# Patient Record
Sex: FEMALE | Race: WHITE | NOT HISPANIC OR LATINO | Employment: OTHER | URBAN - METROPOLITAN AREA
[De-identification: names, ages, dates, MRNs, and addresses within clinical notes are randomized per-mention and may not be internally consistent; named-entity substitution may affect disease eponyms.]

---

## 2021-12-31 ENCOUNTER — APPOINTMENT (OUTPATIENT)
Dept: RADIOLOGY | Facility: MEDICAL CENTER | Age: 76
DRG: 661 | End: 2021-12-31
Attending: EMERGENCY MEDICINE
Payer: COMMERCIAL

## 2021-12-31 ENCOUNTER — ANESTHESIA (OUTPATIENT)
Dept: SURGERY | Facility: MEDICAL CENTER | Age: 76
DRG: 661 | End: 2021-12-31
Payer: COMMERCIAL

## 2021-12-31 ENCOUNTER — HOSPITAL ENCOUNTER (OUTPATIENT)
Dept: RADIOLOGY | Facility: MEDICAL CENTER | Age: 76
End: 2021-12-31

## 2021-12-31 ENCOUNTER — APPOINTMENT (OUTPATIENT)
Dept: RADIOLOGY | Facility: MEDICAL CENTER | Age: 76
DRG: 661 | End: 2021-12-31
Attending: UROLOGY
Payer: COMMERCIAL

## 2021-12-31 ENCOUNTER — HOSPITAL ENCOUNTER (INPATIENT)
Facility: MEDICAL CENTER | Age: 76
LOS: 1 days | DRG: 661 | End: 2022-01-01
Attending: EMERGENCY MEDICINE | Admitting: STUDENT IN AN ORGANIZED HEALTH CARE EDUCATION/TRAINING PROGRAM
Payer: COMMERCIAL

## 2021-12-31 ENCOUNTER — ANESTHESIA EVENT (OUTPATIENT)
Dept: SURGERY | Facility: MEDICAL CENTER | Age: 76
DRG: 661 | End: 2021-12-31
Payer: COMMERCIAL

## 2021-12-31 DIAGNOSIS — N20.1 URETEROLITHIASIS: ICD-10-CM

## 2021-12-31 DIAGNOSIS — R10.9 FLANK PAIN: ICD-10-CM

## 2021-12-31 PROBLEM — N10 ACUTE PYELONEPHRITIS: Status: ACTIVE | Noted: 2021-12-31

## 2021-12-31 PROBLEM — N18.9 CKD (CHRONIC KIDNEY DISEASE): Status: RESOLVED | Noted: 2021-12-31 | Resolved: 2021-12-31

## 2021-12-31 PROBLEM — N13.5 URETEROVESICAL JUNCTION (UVJ) OBSTRUCTION: Status: ACTIVE | Noted: 2021-12-31

## 2021-12-31 PROBLEM — E78.5 DYSLIPIDEMIA: Status: ACTIVE | Noted: 2021-12-31

## 2021-12-31 PROBLEM — N18.9 CKD (CHRONIC KIDNEY DISEASE): Status: ACTIVE | Noted: 2021-12-31

## 2021-12-31 LAB
ALBUMIN SERPL BCP-MCNC: 4.3 G/DL (ref 3.2–4.9)
ALBUMIN/GLOB SERPL: 2.2 G/DL
ALP SERPL-CCNC: 89 U/L (ref 30–99)
ALT SERPL-CCNC: 20 U/L (ref 2–50)
ANION GAP SERPL CALC-SCNC: 13 MMOL/L (ref 7–16)
APPEARANCE UR: ABNORMAL
AST SERPL-CCNC: 20 U/L (ref 12–45)
BACTERIA #/AREA URNS HPF: NEGATIVE /HPF
BASOPHILS # BLD AUTO: 0.5 % (ref 0–1.8)
BASOPHILS # BLD: 0.04 K/UL (ref 0–0.12)
BILIRUB SERPL-MCNC: 0.7 MG/DL (ref 0.1–1.5)
BILIRUB UR QL STRIP.AUTO: NEGATIVE
BUN SERPL-MCNC: 20 MG/DL (ref 8–22)
CALCIUM SERPL-MCNC: 9.3 MG/DL (ref 8.5–10.5)
CAOX CRY #/AREA URNS HPF: ABNORMAL /HPF
CHLORIDE SERPL-SCNC: 105 MMOL/L (ref 96–112)
CO2 SERPL-SCNC: 20 MMOL/L (ref 20–33)
COLOR UR: YELLOW
CREAT SERPL-MCNC: 1.21 MG/DL (ref 0.5–1.4)
EKG IMPRESSION: NORMAL
EOSINOPHIL # BLD AUTO: 0.03 K/UL (ref 0–0.51)
EOSINOPHIL NFR BLD: 0.4 % (ref 0–6.9)
EPI CELLS #/AREA URNS HPF: ABNORMAL /HPF
ERYTHROCYTE [DISTWIDTH] IN BLOOD BY AUTOMATED COUNT: 45.2 FL (ref 35.9–50)
GLOBULIN SER CALC-MCNC: 2 G/DL (ref 1.9–3.5)
GLUCOSE SERPL-MCNC: 105 MG/DL (ref 65–99)
GLUCOSE UR STRIP.AUTO-MCNC: NEGATIVE MG/DL
HCT VFR BLD AUTO: 42.7 % (ref 37–47)
HGB BLD-MCNC: 13.9 G/DL (ref 12–16)
IMM GRANULOCYTES # BLD AUTO: 0.03 K/UL (ref 0–0.11)
IMM GRANULOCYTES NFR BLD AUTO: 0.4 % (ref 0–0.9)
KETONES UR STRIP.AUTO-MCNC: 15 MG/DL
LEUKOCYTE ESTERASE UR QL STRIP.AUTO: ABNORMAL
LIPASE SERPL-CCNC: 26 U/L (ref 11–82)
LYMPHOCYTES # BLD AUTO: 1.09 K/UL (ref 1–4.8)
LYMPHOCYTES NFR BLD: 14.9 % (ref 22–41)
MAGNESIUM SERPL-MCNC: 1.9 MG/DL (ref 1.5–2.5)
MCH RBC QN AUTO: 30 PG (ref 27–33)
MCHC RBC AUTO-ENTMCNC: 32.6 G/DL (ref 33.6–35)
MCV RBC AUTO: 92.2 FL (ref 81.4–97.8)
MICRO URNS: ABNORMAL
MONOCYTES # BLD AUTO: 0.65 K/UL (ref 0–0.85)
MONOCYTES NFR BLD AUTO: 8.9 % (ref 0–13.4)
NEUTROPHILS # BLD AUTO: 5.49 K/UL (ref 2–7.15)
NEUTROPHILS NFR BLD: 74.9 % (ref 44–72)
NITRITE UR QL STRIP.AUTO: NEGATIVE
NRBC # BLD AUTO: 0 K/UL
NRBC BLD-RTO: 0 /100 WBC
PH UR STRIP.AUTO: 5 [PH] (ref 5–8)
PHOSPHATE SERPL-MCNC: 2.8 MG/DL (ref 2.5–4.5)
PLATELET # BLD AUTO: 259 K/UL (ref 164–446)
PMV BLD AUTO: 10.4 FL (ref 9–12.9)
POTASSIUM SERPL-SCNC: 4.3 MMOL/L (ref 3.6–5.5)
PROT SERPL-MCNC: 6.3 G/DL (ref 6–8.2)
PROT UR QL STRIP: NEGATIVE MG/DL
RBC # BLD AUTO: 4.63 M/UL (ref 4.2–5.4)
RBC # URNS HPF: ABNORMAL /HPF
RBC UR QL AUTO: ABNORMAL
SARS-COV+SARS-COV-2 AG RESP QL IA.RAPID: NOTDETECTED
SODIUM SERPL-SCNC: 138 MMOL/L (ref 135–145)
SP GR UR STRIP.AUTO: 1.03
SPECIMEN SOURCE: NORMAL
UROBILINOGEN UR STRIP.AUTO-MCNC: 0.2 MG/DL
WBC # BLD AUTO: 7.3 K/UL (ref 4.8–10.8)
WBC #/AREA URNS HPF: ABNORMAL /HPF

## 2021-12-31 PROCEDURE — 700105 HCHG RX REV CODE 258: Performed by: STUDENT IN AN ORGANIZED HEALTH CARE EDUCATION/TRAINING PROGRAM

## 2021-12-31 PROCEDURE — 700111 HCHG RX REV CODE 636 W/ 250 OVERRIDE (IP): Performed by: STUDENT IN AN ORGANIZED HEALTH CARE EDUCATION/TRAINING PROGRAM

## 2021-12-31 PROCEDURE — 700111 HCHG RX REV CODE 636 W/ 250 OVERRIDE (IP): Performed by: EMERGENCY MEDICINE

## 2021-12-31 PROCEDURE — 76775 US EXAM ABDO BACK WALL LIM: CPT

## 2021-12-31 PROCEDURE — 500879 HCHG PACK, CYSTO: Performed by: UROLOGY

## 2021-12-31 PROCEDURE — 81015 MICROSCOPIC EXAM OF URINE: CPT

## 2021-12-31 PROCEDURE — C2617 STENT, NON-COR, TEM W/O DEL: HCPCS | Performed by: UROLOGY

## 2021-12-31 PROCEDURE — 93005 ELECTROCARDIOGRAM TRACING: CPT | Performed by: UROLOGY

## 2021-12-31 PROCEDURE — 85025 COMPLETE CBC W/AUTO DIFF WBC: CPT

## 2021-12-31 PROCEDURE — 84100 ASSAY OF PHOSPHORUS: CPT

## 2021-12-31 PROCEDURE — 160035 HCHG PACU - 1ST 60 MINS PHASE I: Performed by: UROLOGY

## 2021-12-31 PROCEDURE — 700102 HCHG RX REV CODE 250 W/ 637 OVERRIDE(OP): Performed by: STUDENT IN AN ORGANIZED HEALTH CARE EDUCATION/TRAINING PROGRAM

## 2021-12-31 PROCEDURE — 83735 ASSAY OF MAGNESIUM: CPT

## 2021-12-31 PROCEDURE — 93010 ELECTROCARDIOGRAM REPORT: CPT | Performed by: INTERNAL MEDICINE

## 2021-12-31 PROCEDURE — 87426 SARSCOV CORONAVIRUS AG IA: CPT

## 2021-12-31 PROCEDURE — 87086 URINE CULTURE/COLONY COUNT: CPT

## 2021-12-31 PROCEDURE — 501329 HCHG SET, CYSTO IRRIG Y TUR: Performed by: UROLOGY

## 2021-12-31 PROCEDURE — C1769 GUIDE WIRE: HCPCS | Performed by: UROLOGY

## 2021-12-31 PROCEDURE — 160002 HCHG RECOVERY MINUTES (STAT): Performed by: UROLOGY

## 2021-12-31 PROCEDURE — 99291 CRITICAL CARE FIRST HOUR: CPT

## 2021-12-31 PROCEDURE — 96374 THER/PROPH/DIAG INJ IV PUSH: CPT

## 2021-12-31 PROCEDURE — 80053 COMPREHEN METABOLIC PANEL: CPT

## 2021-12-31 PROCEDURE — 83690 ASSAY OF LIPASE: CPT

## 2021-12-31 PROCEDURE — A9270 NON-COVERED ITEM OR SERVICE: HCPCS | Performed by: STUDENT IN AN ORGANIZED HEALTH CARE EDUCATION/TRAINING PROGRAM

## 2021-12-31 PROCEDURE — 700101 HCHG RX REV CODE 250: Performed by: STUDENT IN AN ORGANIZED HEALTH CARE EDUCATION/TRAINING PROGRAM

## 2021-12-31 PROCEDURE — 81003 URINALYSIS AUTO W/O SCOPE: CPT

## 2021-12-31 PROCEDURE — 99222 1ST HOSP IP/OBS MODERATE 55: CPT | Performed by: STUDENT IN AN ORGANIZED HEALTH CARE EDUCATION/TRAINING PROGRAM

## 2021-12-31 PROCEDURE — 160028 HCHG SURGERY MINUTES - 1ST 30 MINS LEVEL 3: Performed by: UROLOGY

## 2021-12-31 PROCEDURE — 160009 HCHG ANES TIME/MIN: Performed by: UROLOGY

## 2021-12-31 PROCEDURE — 770001 HCHG ROOM/CARE - MED/SURG/GYN PRIV*

## 2021-12-31 PROCEDURE — 160048 HCHG OR STATISTICAL LEVEL 1-5: Performed by: UROLOGY

## 2021-12-31 DEVICE — STENT UROLOGICAL POLARIS 6X24  ULTRA: Type: IMPLANTABLE DEVICE | Site: URETER | Status: FUNCTIONAL

## 2021-12-31 RX ORDER — ONDANSETRON 4 MG/1
4 TABLET, ORALLY DISINTEGRATING ORAL EVERY 6 HOURS PRN
COMMUNITY
Start: 2021-12-31

## 2021-12-31 RX ORDER — ATORVASTATIN CALCIUM 20 MG/1
20 TABLET, FILM COATED ORAL
COMMUNITY
Start: 2021-11-22

## 2021-12-31 RX ORDER — SODIUM CHLORIDE, SODIUM LACTATE, POTASSIUM CHLORIDE, CALCIUM CHLORIDE 600; 310; 30; 20 MG/100ML; MG/100ML; MG/100ML; MG/100ML
INJECTION, SOLUTION INTRAVENOUS
Status: DISCONTINUED | OUTPATIENT
Start: 2021-12-31 | End: 2021-12-31 | Stop reason: SURG

## 2021-12-31 RX ORDER — ACETAMINOPHEN 325 MG/1
650 TABLET ORAL EVERY 6 HOURS PRN
Status: DISCONTINUED | OUTPATIENT
Start: 2021-12-31 | End: 2022-01-01 | Stop reason: HOSPADM

## 2021-12-31 RX ORDER — POLYETHYLENE GLYCOL 3350 17 G/17G
1 POWDER, FOR SOLUTION ORAL
Status: DISCONTINUED | OUTPATIENT
Start: 2021-12-31 | End: 2022-01-01 | Stop reason: HOSPADM

## 2021-12-31 RX ORDER — OXYCODONE HCL 5 MG/5 ML
10 SOLUTION, ORAL ORAL
Status: DISCONTINUED | OUTPATIENT
Start: 2021-12-31 | End: 2021-12-31 | Stop reason: HOSPADM

## 2021-12-31 RX ORDER — HYDROMORPHONE HYDROCHLORIDE 1 MG/ML
0.4 INJECTION, SOLUTION INTRAMUSCULAR; INTRAVENOUS; SUBCUTANEOUS
Status: DISCONTINUED | OUTPATIENT
Start: 2021-12-31 | End: 2021-12-31 | Stop reason: HOSPADM

## 2021-12-31 RX ORDER — OXYCODONE HYDROCHLORIDE 5 MG/1
5 TABLET ORAL
Status: DISCONTINUED | OUTPATIENT
Start: 2021-12-31 | End: 2022-01-01 | Stop reason: HOSPADM

## 2021-12-31 RX ORDER — ACETAMINOPHEN 325 MG/1
TABLET ORAL PRN
Status: DISCONTINUED | OUTPATIENT
Start: 2021-12-31 | End: 2021-12-31 | Stop reason: SURG

## 2021-12-31 RX ORDER — CEFDINIR 300 MG/1
300 CAPSULE ORAL EVERY 12 HOURS
COMMUNITY
Start: 2021-12-31

## 2021-12-31 RX ORDER — ONDANSETRON 2 MG/ML
4 INJECTION INTRAMUSCULAR; INTRAVENOUS EVERY 4 HOURS PRN
Status: DISCONTINUED | OUTPATIENT
Start: 2021-12-31 | End: 2022-01-01 | Stop reason: HOSPADM

## 2021-12-31 RX ORDER — BISACODYL 10 MG
10 SUPPOSITORY, RECTAL RECTAL
Status: DISCONTINUED | OUTPATIENT
Start: 2021-12-31 | End: 2022-01-01 | Stop reason: HOSPADM

## 2021-12-31 RX ORDER — AMOXICILLIN 250 MG
2 CAPSULE ORAL 2 TIMES DAILY
Status: DISCONTINUED | OUTPATIENT
Start: 2022-01-01 | End: 2022-01-01 | Stop reason: HOSPADM

## 2021-12-31 RX ORDER — SODIUM CHLORIDE, SODIUM LACTATE, POTASSIUM CHLORIDE, CALCIUM CHLORIDE 600; 310; 30; 20 MG/100ML; MG/100ML; MG/100ML; MG/100ML
INJECTION, SOLUTION INTRAVENOUS CONTINUOUS
Status: DISCONTINUED | OUTPATIENT
Start: 2021-12-31 | End: 2022-01-01 | Stop reason: HOSPADM

## 2021-12-31 RX ORDER — HYDROMORPHONE HYDROCHLORIDE 1 MG/ML
0.1 INJECTION, SOLUTION INTRAMUSCULAR; INTRAVENOUS; SUBCUTANEOUS
Status: DISCONTINUED | OUTPATIENT
Start: 2021-12-31 | End: 2021-12-31 | Stop reason: HOSPADM

## 2021-12-31 RX ORDER — OXYCODONE HCL 5 MG/5 ML
5 SOLUTION, ORAL ORAL
Status: DISCONTINUED | OUTPATIENT
Start: 2021-12-31 | End: 2021-12-31 | Stop reason: HOSPADM

## 2021-12-31 RX ORDER — HYDROCODONE BITARTRATE AND ACETAMINOPHEN 5; 325 MG/1; MG/1
1 TABLET ORAL EVERY 4 HOURS PRN
COMMUNITY
Start: 2021-12-30

## 2021-12-31 RX ORDER — KETOROLAC TROMETHAMINE 10 MG/1
10 TABLET, FILM COATED ORAL EVERY 6 HOURS PRN
COMMUNITY
Start: 2021-12-30

## 2021-12-31 RX ORDER — OXYCODONE HYDROCHLORIDE 10 MG/1
10 TABLET ORAL
Status: DISCONTINUED | OUTPATIENT
Start: 2021-12-31 | End: 2022-01-01 | Stop reason: HOSPADM

## 2021-12-31 RX ORDER — CEFTRIAXONE 2 G/1
2 INJECTION, POWDER, FOR SOLUTION INTRAMUSCULAR; INTRAVENOUS ONCE
Status: COMPLETED | OUTPATIENT
Start: 2021-12-31 | End: 2021-12-31

## 2021-12-31 RX ORDER — ONDANSETRON 2 MG/ML
4 INJECTION INTRAMUSCULAR; INTRAVENOUS
Status: DISCONTINUED | OUTPATIENT
Start: 2021-12-31 | End: 2021-12-31 | Stop reason: HOSPADM

## 2021-12-31 RX ORDER — DEXAMETHASONE SODIUM PHOSPHATE 4 MG/ML
INJECTION, SOLUTION INTRA-ARTICULAR; INTRALESIONAL; INTRAMUSCULAR; INTRAVENOUS; SOFT TISSUE PRN
Status: DISCONTINUED | OUTPATIENT
Start: 2021-12-31 | End: 2021-12-31 | Stop reason: SURG

## 2021-12-31 RX ORDER — LABETALOL HYDROCHLORIDE 5 MG/ML
10 INJECTION, SOLUTION INTRAVENOUS EVERY 4 HOURS PRN
Status: DISCONTINUED | OUTPATIENT
Start: 2021-12-31 | End: 2022-01-01 | Stop reason: HOSPADM

## 2021-12-31 RX ORDER — POLYETHYLENE GLYCOL 3350 17 G/17G
17 POWDER, FOR SOLUTION ORAL
COMMUNITY

## 2021-12-31 RX ORDER — LIDOCAINE HYDROCHLORIDE 20 MG/ML
INJECTION, SOLUTION EPIDURAL; INFILTRATION; INTRACAUDAL; PERINEURAL PRN
Status: DISCONTINUED | OUTPATIENT
Start: 2021-12-31 | End: 2021-12-31 | Stop reason: SURG

## 2021-12-31 RX ORDER — HYDROMORPHONE HYDROCHLORIDE 1 MG/ML
0.5 INJECTION, SOLUTION INTRAMUSCULAR; INTRAVENOUS; SUBCUTANEOUS
Status: DISCONTINUED | OUTPATIENT
Start: 2021-12-31 | End: 2022-01-01 | Stop reason: HOSPADM

## 2021-12-31 RX ORDER — ONDANSETRON 4 MG/1
4 TABLET, ORALLY DISINTEGRATING ORAL EVERY 4 HOURS PRN
Status: DISCONTINUED | OUTPATIENT
Start: 2021-12-31 | End: 2022-01-01 | Stop reason: HOSPADM

## 2021-12-31 RX ORDER — HALOPERIDOL 5 MG/ML
1 INJECTION INTRAMUSCULAR
Status: DISCONTINUED | OUTPATIENT
Start: 2021-12-31 | End: 2021-12-31 | Stop reason: HOSPADM

## 2021-12-31 RX ORDER — ACETAMINOPHEN 500 MG
TABLET ORAL
Status: COMPLETED
Start: 2021-12-31 | End: 2021-12-31

## 2021-12-31 RX ORDER — PHENOL 1.4 %
1 AEROSOL, SPRAY (ML) MUCOUS MEMBRANE EVERY MORNING
COMMUNITY

## 2021-12-31 RX ORDER — HYDROMORPHONE HYDROCHLORIDE 1 MG/ML
0.2 INJECTION, SOLUTION INTRAMUSCULAR; INTRAVENOUS; SUBCUTANEOUS
Status: DISCONTINUED | OUTPATIENT
Start: 2021-12-31 | End: 2021-12-31 | Stop reason: HOSPADM

## 2021-12-31 RX ADMIN — CEFTRIAXONE SODIUM 2 G: 2 INJECTION, POWDER, FOR SOLUTION INTRAMUSCULAR; INTRAVENOUS at 19:52

## 2021-12-31 RX ADMIN — ACETAMINOPHEN 500 MG: 325 TABLET, FILM COATED ORAL at 20:55

## 2021-12-31 RX ADMIN — EPHEDRINE SULFATE 15 MG: 50 INJECTION, SOLUTION INTRAVENOUS at 21:32

## 2021-12-31 RX ADMIN — SODIUM CHLORIDE, POTASSIUM CHLORIDE, SODIUM LACTATE AND CALCIUM CHLORIDE: 600; 310; 30; 20 INJECTION, SOLUTION INTRAVENOUS at 21:21

## 2021-12-31 RX ADMIN — PROPOFOL 150 MG: 10 INJECTION, EMULSION INTRAVENOUS at 21:27

## 2021-12-31 RX ADMIN — SODIUM CHLORIDE, POTASSIUM CHLORIDE, SODIUM LACTATE AND CALCIUM CHLORIDE: 600; 310; 30; 20 INJECTION, SOLUTION INTRAVENOUS at 22:51

## 2021-12-31 RX ADMIN — FENTANYL CITRATE 50 MCG: 50 INJECTION, SOLUTION INTRAMUSCULAR; INTRAVENOUS at 21:27

## 2021-12-31 RX ADMIN — SODIUM CHLORIDE, POTASSIUM CHLORIDE, SODIUM LACTATE AND CALCIUM CHLORIDE: 600; 310; 30; 20 INJECTION, SOLUTION INTRAVENOUS at 19:59

## 2021-12-31 RX ADMIN — ONDANSETRON 4 MG: 2 INJECTION INTRAMUSCULAR; INTRAVENOUS at 21:32

## 2021-12-31 RX ADMIN — DEXAMETHASONE SODIUM PHOSPHATE 4 MG: 4 INJECTION, SOLUTION INTRA-ARTICULAR; INTRALESIONAL; INTRAMUSCULAR; INTRAVENOUS; SOFT TISSUE at 21:32

## 2021-12-31 RX ADMIN — LIDOCAINE HYDROCHLORIDE 70 MG: 20 INJECTION, SOLUTION EPIDURAL; INFILTRATION; INTRACAUDAL at 21:27

## 2021-12-31 RX ADMIN — EPHEDRINE SULFATE 10 MG: 50 INJECTION, SOLUTION INTRAVENOUS at 21:40

## 2021-12-31 ASSESSMENT — ENCOUNTER SYMPTOMS
FLANK PAIN: 1
MYALGIAS: 0
SHORTNESS OF BREATH: 0
SENSORY CHANGE: 0
SPUTUM PRODUCTION: 0
NERVOUS/ANXIOUS: 0
VOMITING: 1
WEAKNESS: 0
FEVER: 0
PALPITATIONS: 0
PHOTOPHOBIA: 0
NECK PAIN: 0
SEIZURES: 0
DOUBLE VISION: 0
LOSS OF CONSCIOUSNESS: 0
NAUSEA: 1
CONSTIPATION: 1
CHILLS: 1
COUGH: 0
HALLUCINATIONS: 0

## 2021-12-31 NOTE — ED NOTES
Pts spouse ambulated up to the  with complaints of worsening pain and discomfort per pt. Pt and spouse concerned over wait time, triage process was explained to patient along with wait time. Apologized for the wait time.

## 2021-12-31 NOTE — ED TRIAGE NOTES
Chief Complaint   Patient presents with   • Flank Pain     Pt BIB self to triage, report of left sided flank pain that started yesterday, hx of same. pt went to [facility] with no relief. pt states the ER had no Urology on call and thats why she came to [facility].   • N/V   • Chills   Pt given urinary supplies/instruction.    Explained to pt triage process, made pt aware to tell this RN/staff of any changes/concerns, pt verbalized understanding of process and instructions given. Pt to ER lobby.     Patient safety sitter maintains at bedside in direct sight of patient with 1:1 monitoring.

## 2022-01-01 VITALS
TEMPERATURE: 98.7 F | DIASTOLIC BLOOD PRESSURE: 59 MMHG | BODY MASS INDEX: 24.57 KG/M2 | RESPIRATION RATE: 18 BRPM | SYSTOLIC BLOOD PRESSURE: 121 MMHG | HEART RATE: 80 BPM | OXYGEN SATURATION: 97 % | WEIGHT: 156.53 LBS | HEIGHT: 67 IN

## 2022-01-01 PROBLEM — N10 ACUTE PYELONEPHRITIS: Status: RESOLVED | Noted: 2021-12-31 | Resolved: 2022-01-01

## 2022-01-01 PROBLEM — N13.5 URETEROVESICAL JUNCTION (UVJ) OBSTRUCTION: Status: RESOLVED | Noted: 2021-12-31 | Resolved: 2022-01-01

## 2022-01-01 LAB
ANION GAP SERPL CALC-SCNC: 12 MMOL/L (ref 7–16)
BASOPHILS # BLD AUTO: 0.2 % (ref 0–1.8)
BASOPHILS # BLD: 0.01 K/UL (ref 0–0.12)
BUN SERPL-MCNC: 18 MG/DL (ref 8–22)
CALCIUM SERPL-MCNC: 8.8 MG/DL (ref 8.5–10.5)
CHLORIDE SERPL-SCNC: 108 MMOL/L (ref 96–112)
CO2 SERPL-SCNC: 21 MMOL/L (ref 20–33)
CREAT SERPL-MCNC: 1.11 MG/DL (ref 0.5–1.4)
EOSINOPHIL # BLD AUTO: 0 K/UL (ref 0–0.51)
EOSINOPHIL NFR BLD: 0 % (ref 0–6.9)
ERYTHROCYTE [DISTWIDTH] IN BLOOD BY AUTOMATED COUNT: 44.9 FL (ref 35.9–50)
GLUCOSE SERPL-MCNC: 142 MG/DL (ref 65–99)
HCT VFR BLD AUTO: 40.1 % (ref 37–47)
HGB BLD-MCNC: 13.4 G/DL (ref 12–16)
IMM GRANULOCYTES # BLD AUTO: 0.03 K/UL (ref 0–0.11)
IMM GRANULOCYTES NFR BLD AUTO: 0.6 % (ref 0–0.9)
LYMPHOCYTES # BLD AUTO: 0.65 K/UL (ref 1–4.8)
LYMPHOCYTES NFR BLD: 12.3 % (ref 22–41)
MCH RBC QN AUTO: 30.9 PG (ref 27–33)
MCHC RBC AUTO-ENTMCNC: 33.4 G/DL (ref 33.6–35)
MCV RBC AUTO: 92.6 FL (ref 81.4–97.8)
MONOCYTES # BLD AUTO: 0.07 K/UL (ref 0–0.85)
MONOCYTES NFR BLD AUTO: 1.3 % (ref 0–13.4)
NEUTROPHILS # BLD AUTO: 4.54 K/UL (ref 2–7.15)
NEUTROPHILS NFR BLD: 85.6 % (ref 44–72)
NRBC # BLD AUTO: 0 K/UL
NRBC BLD-RTO: 0 /100 WBC
PLATELET # BLD AUTO: 237 K/UL (ref 164–446)
PMV BLD AUTO: 10 FL (ref 9–12.9)
POTASSIUM SERPL-SCNC: 4.3 MMOL/L (ref 3.6–5.5)
RBC # BLD AUTO: 4.33 M/UL (ref 4.2–5.4)
SODIUM SERPL-SCNC: 141 MMOL/L (ref 135–145)
WBC # BLD AUTO: 5.3 K/UL (ref 4.8–10.8)

## 2022-01-01 PROCEDURE — 99239 HOSP IP/OBS DSCHRG MGMT >30: CPT | Performed by: HOSPITALIST

## 2022-01-01 PROCEDURE — 80048 BASIC METABOLIC PNL TOTAL CA: CPT

## 2022-01-01 PROCEDURE — 36415 COLL VENOUS BLD VENIPUNCTURE: CPT

## 2022-01-01 PROCEDURE — 85025 COMPLETE CBC W/AUTO DIFF WBC: CPT

## 2022-01-01 ASSESSMENT — LIFESTYLE VARIABLES
EVER HAD A DRINK FIRST THING IN THE MORNING TO STEADY YOUR NERVES TO GET RID OF A HANGOVER: NO
TOTAL SCORE: 0
HAVE PEOPLE ANNOYED YOU BY CRITICIZING YOUR DRINKING: NO
CONSUMPTION TOTAL: NEGATIVE
ON A TYPICAL DAY WHEN YOU DRINK ALCOHOL HOW MANY DRINKS DO YOU HAVE: 1
ALCOHOL_USE: YES
EVER FELT BAD OR GUILTY ABOUT YOUR DRINKING: NO
AVERAGE NUMBER OF DAYS PER WEEK YOU HAVE A DRINK CONTAINING ALCOHOL: 0
HAVE YOU EVER FELT YOU SHOULD CUT DOWN ON YOUR DRINKING: NO
TOTAL SCORE: 0
TOTAL SCORE: 0
DOES PATIENT WANT TO STOP DRINKING: NO
HOW MANY TIMES IN THE PAST YEAR HAVE YOU HAD 5 OR MORE DRINKS IN A DAY: 0
SUBSTANCE_ABUSE: 0

## 2022-01-01 ASSESSMENT — COGNITIVE AND FUNCTIONAL STATUS - GENERAL
CLIMB 3 TO 5 STEPS WITH RAILING: A LITTLE
DAILY ACTIVITIY SCORE: 24
SUGGESTED CMS G CODE MODIFIER MOBILITY: CJ
WALKING IN HOSPITAL ROOM: A LITTLE
MOBILITY SCORE: 22
SUGGESTED CMS G CODE MODIFIER DAILY ACTIVITY: CH

## 2022-01-01 ASSESSMENT — ENCOUNTER SYMPTOMS
PALPITATIONS: 0
NECK PAIN: 0
NAUSEA: 0
FEVER: 0
MYALGIAS: 0
DEPRESSION: 0
BLURRED VISION: 0
DIARRHEA: 0
DOUBLE VISION: 0
VOMITING: 0
ABDOMINAL PAIN: 0
CHILLS: 0
TINGLING: 0
WEIGHT LOSS: 0
DIZZINESS: 0
SPUTUM PRODUCTION: 0
HEADACHES: 0
HEMOPTYSIS: 0
COUGH: 0

## 2022-01-01 ASSESSMENT — PATIENT HEALTH QUESTIONNAIRE - PHQ9
SUM OF ALL RESPONSES TO PHQ9 QUESTIONS 1 AND 2: 0
2. FEELING DOWN, DEPRESSED, IRRITABLE, OR HOPELESS: NOT AT ALL
1. LITTLE INTEREST OR PLEASURE IN DOING THINGS: NOT AT ALL

## 2022-01-01 NOTE — ED NOTES
Received report from Malaika GALE RN. Upon shift change pt is resting in bed with even and unlabored breaths, in no apparent distress. Will continue to monitor.

## 2022-01-01 NOTE — ANESTHESIA TIME REPORT
Anesthesia Start and Stop Event Times     Date Time Event    12/31/2021 2055 Ready for Procedure     2121 Anesthesia Start     2155 Anesthesia Stop        Responsible Staff  12/31/21    Name Role Begin End    Min KATHERINE Hurley M.D. Anesth 2121 2155        Preop Diagnosis (Free Text):  Pre-op Diagnosis             Preop Diagnosis (Codes):    Premium Reason  A. 3PM - 7AM    Comments:

## 2022-01-01 NOTE — ANESTHESIA POSTPROCEDURE EVALUATION
Patient: Ro Fletcher    Procedure Summary     Date: 12/31/21 Room / Location: [place] [address] / SURGERY [facility]    Anesthesia Start: 2121 Anesthesia Stop: 2155    Procedure: CYSTOSCOPY, WITH URETERAL STENT INSERTION (Left Ureter) Diagnosis: (LEFT DISTAL URETERAL STONE, URINARY TRACT INFECTION)    Surgeons: Curt Strauss M.D. Responsible Provider: Obed Hurley M.D.    Anesthesia Type: general ASA Status: 2 - Emergent          Final Anesthesia Type: general  Last vitals  BP   Blood Pressure : 126/40    Temp   36.4 °C (97.6 °F)    Pulse   62   Resp   18    SpO2   94 %      Anesthesia Post Evaluation    Patient location during evaluation: PACU  Patient participation: complete - patient participated  Level of consciousness: awake and alert    Airway patency: patent  Anesthetic complications: no  Cardiovascular status: hemodynamically stable  Respiratory status: acceptable  Hydration status: euvolemic    PONV: none          No complications documented.     Nurse Pain Score: 0 (NPRS)

## 2022-01-01 NOTE — PROGRESS NOTES
76 year old with left flank pain and 6mm distal ureteral stone with UTI. Discussed with the patient the recommendation for cystoscopy and a left stent as well as the risks and benefits. Informed consent has been given to me by  Justine to proceed with the cystoscopy and left stent.

## 2022-01-01 NOTE — OP REPORT
DATE OF SERVICE:  12/31/2021     PREOPERATIVE DIAGNOSES:  1.  Left flank pain.  2.  Left distal ureteral stone.  3.  Left obstructing pyelonephrosis.     OPERATIONS AND PROCEDURES PERFORMED:  1.  Rigid cystourethroscopy.  2.  Left 6-St Helenian x 24 cm double-J stent insertion.     SURGEON:  Curt Strauss MD     ANESTHESIOLOGIST:  Obed Hurley MD     ANESTHESIA:  General laryngeal mask.     POSTOPERATIVE DIAGNOSES:  1.  Left flank pain.  2.  Left distal ureteral stone.  3.  Left obstructing pyelonephrosis.     COMPLICATIONS:  None.     DRAINS:  A 6-St Helenian x 24 cm stent, left ureter.     INTRAOPERATIVE FINDINGS:  The patient has a high-grade left ureteral   obstruction with pus draining under pressure, plus placement of guidewire and   continuing with stent placement.     INDICATIONS:  The patient is a 76-year-old woman with a 1-week history of   hematuria.  She was evaluated, went on to develop a urinary tract infection,   started on antibiotic therapy but then went on to develop severe intractable   nausea, vomiting, malaise, chills and was presented to [facility] Emergency Room   with evidence of obstructing pyonephrosis.  Films were sent from [facility] and   showed evidence of a left 5.6 mm distal ureteral stone with hydronephrosis.  I   discussed with the patient the option of nephrostomy tube versus stent.  She   wished to proceed with stent.  We discussed the risk of the procedure   including but not limited to risk of perioperative worsening of her infection   with sepsis, a risk of inability to place a stent requiring nephrostomy tube.    With stent placement, there is associated risk of having significant pain,   urgency, frequency, migration as well as the fact that no attempts will be   made to treat the stone due to the presence of infection and that she will   need a secondary procedure, failure to follow through with the stent removal   can result in significant stone formation and loss of renal function.  I have    also discussed the perioperative risk of deep vein thrombosis, pulmonary   embolism, aspiration pneumonia, heart attack, stroke and death.  Informed   consent was given to me by the patient to proceed and she was marked on her   left thigh with my initials DH and letter Y for safe site surgery.     DESCRIPTION IN DETAIL:  After informed consent was obtained, the patient was   brought to the operating room and placed supine.  A general laryngeal mask   anesthetic was administered in a balanced fashion.  Bilateral sequential   compression device in place and operational and the patient was positioned in   modified lithotomy and the operative area was Betadine prepped and draped in   the usual sterile fashion.  A surgical timeout was called and all members of   the operative team agree as the patient's name, procedure to be performed, the   fact that it is left side and that she has received Rocephin.  As such,   attention was directed to the procedure.     I passed a generously lubricated 25-Omani rigid cystoscope per urethra.  The   urethra was normal.  The bladder showed mild erythema of the left ureteral   orifice did not exhibit efflux.  The right ureteral orifice had efflux.  There   was 1+ trabeculation.  The bladder was drained.  I then cannulated the left   ureteral orifice with a 0.038 Sensor wire.  Upon doing so, high-grade pus was   seen draining out of the left ureter, advanced the wire up into the kidney and   try to use as minimal hydrodistention of the bladder as possible.  With   drainage of the bladder, I was able to advance a #6-Omani x 24 cm double-J   stent up into her kidney.  When I withdrew the guidewire, there was a good   coil in the renal pelvis and in the bladder, the eyelets were seen draining   pus under high pressure.  At the end of the case, the bladder was drained.    She tolerated the procedure well without complication, was awakened in the   operating room and transferred to  recovery room where she arrived in stable   condition.        ______________________________  MD MIKO Easley/KRISSY    DD:  12/31/2021 22:08  DT:  12/31/2021 22:43    Job#:  [id number]

## 2022-01-01 NOTE — OR SURGEON
Immediate Post OP Note    PreOp Diagnosis:Left flank pain                               Left distal ureteral stone                                Left obstructing pyonephrosis      PostOp Diagnosis: As above      Procedure(s):  CYSTOSCOPY   LEFT URETERAL STENT INSERTION    Surgeon(s):  Curt Strauss M.D.    Anesthesiologist/Type of Anesthesia:  Anesthesiologist: Obed Hurley M.D./General LMA    Surgical Staff:  Circulator: Damian Woodward R.N.  Scrub Person: Cain James    Specimens removed if any:  None    Estimated Blood Loss: N/A    Findings: High grade ureteral obstruction with pus draining under pressure past placement of guide wire and with stent    Complications: None  Drains: 6 Comoran x 24cm stent in the left ureter        12/31/2021 10:00 PM Curt Strauss M.D.

## 2022-01-01 NOTE — H&P
[facility] Medicine History & Physical Note    Date of Service  12/31/2021    Primary Care Physician  Pcp Not In Computer    Consultants  urology    Specialist Names: Dr. Strauss    Code Status  Full Code    Chief Complaint  Chief Complaint   Patient presents with   • Flank Pain     Pt BIB self to triage, report of left sided flank pain that started yesterday, hx of same. pt went to [facility] with no relief. pt states the ER had no Urology on call and thats why she came to [facility].   • N/V   • Chills       History of Presenting Illness  Ro Fletcher is a 76 y.o. female with  dyslipidemia controlled with statin and prior history of multiple episodes of nephrolithiasis, who presented 12/31/2021 with known obstructive UVJ stone with hydroureteronephrosis, now with worsening pain, nausea, vomiting.  Patient has been experiencing hematuria for over a week, was sent for a CT outpatient that was negative for stone.  She had mild left-sided flank pain at that time was described as aching, no aggravating or alleviating factors.  Following her negative CT the pain worsened and she went to the ED today which showed left-sided 5.6 mm UVJ stone with hydroureteronephrosis.  She was not having any systemic symptoms at that time, main complaint was pain and hematuria. Through joint decision-making between ERP, patient, discussion with urology as well as inability to transfer to facilities given capacity patient elected to go home with p.o. Omnicef, Zofran, Toradol.  She was treated with Rocephin in their ED.  Since leaving the hospital she has had chills, nausea with vomiting, worsening left flank pain now severe so she presented to [facility] for further evaluation.  Otherwise denies headaches, dyspnea, cough, diarrhea.  In the ED she was nontoxic, ERP discussed case with Dr. Strauss from urology who agrees to see patient and likely perform procedure tonight.  She was given Rocephin and subsequently for the hospitalist  for admission.    I discussed the plan of care with patient, bedside RN and pharmacy.    Review of Systems  Review of Systems   Constitutional: Positive for chills and malaise/fatigue. Negative for fever.   HENT: Negative for congestion and nosebleeds.    Eyes: Negative for double vision and photophobia.   Respiratory: Negative for cough, sputum production and shortness of breath.    Cardiovascular: Negative for chest pain and palpitations.   Gastrointestinal: Positive for constipation, nausea and vomiting.   Genitourinary: Positive for flank pain and hematuria. Negative for dysuria and urgency.   Musculoskeletal: Negative for myalgias and neck pain.   Neurological: Negative for sensory change, seizures, loss of consciousness and weakness.   Psychiatric/Behavioral: Negative for hallucinations. The patient is not nervous/anxious.        Past Medical History   has no past medical history on file.    Surgical History   has no past surgical history on file.     Family History  family history is not on file.   Family history reviewed with patient. There is no family history that is pertinent to the chief complaint.     Social History   reports that she has never smoked. She has never used smokeless tobacco. She reports current alcohol use. She reports that she does not use drugs.    Allergies  Allergies   Allergen Reactions   • Codeine Vomiting     hallucinations       Medications  None       Physical Exam  Temp:  [36.4 °C (97.6 °F)-36.9 °C (98.5 °F)] 36.9 °C (98.5 °F)  Pulse:  [64-71] 71  Resp:  [13-20] 13  BP: (105-130)/(58-76) 121/76  SpO2:  [99 %-100 %] 99 %  Blood Pressure : 121/76   Temperature: 36.9 °C (98.5 °F)   Pulse: 71   Respiration: 13   Pulse Oximetry: 99 %       Physical Exam  Vitals and nursing note reviewed.   Constitutional:       General: She is not in acute distress.     Appearance: She is normal weight. She is not toxic-appearing.      Comments: 76-year-old female appears stated age alert and  conversant able to speak full sentences without becoming breathless no distress   HENT:      Head: Normocephalic and atraumatic.      Nose: Nose normal. No rhinorrhea.      Mouth/Throat:      Mouth: Mucous membranes are dry.      Pharynx: Oropharynx is clear.   Eyes:      Extraocular Movements: Extraocular movements intact.      Conjunctiva/sclera: Conjunctivae normal.      Pupils: Pupils are equal, round, and reactive to light.   Cardiovascular:      Rate and Rhythm: Normal rate and regular rhythm.      Pulses: Normal pulses.      Heart sounds: No murmur heard.      Pulmonary:      Effort: Pulmonary effort is normal. No respiratory distress.      Breath sounds: Normal breath sounds. No wheezing, rhonchi or rales.   Abdominal:      General: Bowel sounds are normal. There is no distension.      Palpations: Abdomen is soft.      Tenderness: There is abdominal tenderness (suprapubic, mild). There is left CVA tenderness. There is no right CVA tenderness or guarding.   Musculoskeletal:         General: Normal range of motion.      Cervical back: Normal range of motion and neck supple.      Comments: 1+ b/l Lower extremity edema   Skin:     General: Skin is warm and dry.      Capillary Refill: Capillary refill takes less than 2 seconds.   Neurological:      General: No focal deficit present.      Mental Status: She is alert and oriented to person, place, and time. Mental status is at baseline.      Cranial Nerves: No cranial nerve deficit.      Sensory: No sensory deficit.      Motor: No weakness.      Coordination: Coordination normal.   Psychiatric:         Mood and Affect: Mood normal.         Behavior: Behavior normal.         Thought Content: Thought content normal.         Judgment: Judgment normal.         Laboratory:  Recent Labs     12/31/21  1249   WBC 7.3   RBC 4.63   HEMOGLOBIN 13.9   HEMATOCRIT 42.7   MCV 92.2   MCH 30.0   MCHC 32.6*   RDW 45.2   PLATELETCT 259   MPV 10.4     Recent Labs     12/31/21  1249    SODIUM 138   POTASSIUM 4.3   CHLORIDE 105   CO2 20   GLUCOSE 105*   BUN 20   CREATININE 1.21   CALCIUM 9.3     Recent Labs     12/31/21  1249   ALTSGPT 20   ASTSGOT 20   ALKPHOSPHAT 89   TBILIRUBIN 0.7   LIPASE 26   GLUCOSE 105*         No results for input(s): NTPROBNP in the last 72 hours.      No results for input(s): TROPONINT in the last 72 hours.    Imaging:  OUTSIDE IMAGES-CT ABDOMEN /PELVIS   Final Result      US-RENAL   Final Result      1.  Mild to moderate LEFT hydroureteronephrosis.   2.  Unremarkable RIGHT kidney.   3.  Limited evaluation of urinary bladder.        Renal ultrasound showing mild to moderate left hydroureteronephrosis.  no X-Ray or EKG requiring interpretation    Assessment/Plan:  I anticipate this patient will require at least two midnights for appropriate medical management, necessitating inpatient admission.    * Ureterovesical junction (UVJ) obstruction- (present on admission)  Assessment & Plan  OSH CT day prior to admission showed 5.6 left UVJ obstructing stone with hydroureteronephrosis, prescribed antibiotics and discharged with close urological follow-up due to inability to transfer.  Presented today for worsening pain, episodes of nausea chills night prior to presentation.  Dr. Strauss from urology consulted, will see patient and per handoff is planning to take her for procedure tonight.  She received ceftriaxone, will continue.  Follow-up urine cultures    Acute pyelonephritis- (present on admission)  Assessment & Plan  Secondary to obstructing UVJ stone with hydroureteronephrosis.  Urology Dr. Strauss consulted planning procedure tonight per ER report.  Continue ceftriaxone, follow urine culture.  Monitor urine output and renal function.    Dyslipidemia- (present on admission)  Assessment & Plan  Continue statin        VTE prophylaxis: SCDs/TEDs and pharmacologic prophylaxis contraindicated due to going for procedure

## 2022-01-01 NOTE — ASSESSMENT & PLAN NOTE
Secondary to obstructing UVJ stone with hydroureteronephrosis.  S/p stent  Continue ceftriaxone, follow urine culture.  Monitor urine output and renal function.

## 2022-01-01 NOTE — PROGRESS NOTES
Hospital Medicine Daily Progress Note    Date of Service  1/1/2022    Chief Complaint  Ro Fletcher is a 76 y.o. female admitted 12/31/2021 with obstructive uropathy    Hospital Course  No notes on file    Interval Problem Update  S/p stent placement    Patient denies pain to me    Patient is urinating    I started a diet    On iv abx    Await urology clearance for discharge    I have personally seen and examined the patient at bedside. I discussed the plan of care with patient.    Consultants/Specialty  urology    Code Status  Full Code    Disposition  Patient is medically cleared for discharge.   Anticipate discharge to to home with close outpatient follow-up.  I have placed the appropriate orders for post-discharge needs.    Review of Systems  Review of Systems   Constitutional: Negative for chills, fever and weight loss.   HENT: Negative for ear discharge, ear pain, hearing loss and tinnitus.    Eyes: Negative for blurred vision and double vision.   Respiratory: Negative for cough, hemoptysis and sputum production.    Cardiovascular: Negative for chest pain and palpitations.   Gastrointestinal: Negative for abdominal pain, diarrhea, nausea and vomiting.   Genitourinary: Negative for dysuria, frequency and urgency.   Musculoskeletal: Negative for myalgias and neck pain.   Neurological: Negative for dizziness, tingling and headaches.   Psychiatric/Behavioral: Negative for depression, substance abuse and suicidal ideas.        Physical Exam  Temp:  [36.4 °C (97.6 °F)-37.1 °C (98.7 °F)] 37.1 °C (98.7 °F)  Pulse:  [59-80] 80  Resp:  [13-20] 18  BP: (105-148)/(40-83) 121/59  SpO2:  [92 %-100 %] 97 %    Physical Exam  Constitutional:       General: She is not in acute distress.     Appearance: She is not ill-appearing, toxic-appearing or diaphoretic.   HENT:      Nose: Nose normal.      Mouth/Throat:      Mouth: Mucous membranes are moist.   Eyes:      Extraocular Movements: Extraocular movements intact.       Pupils: Pupils are equal, round, and reactive to light.   Cardiovascular:      Rate and Rhythm: Normal rate and regular rhythm.      Pulses: Normal pulses.   Pulmonary:      Effort: No respiratory distress.      Breath sounds: No stridor. No wheezing, rhonchi or rales.   Abdominal:      General: There is no distension.      Palpations: There is no mass.      Tenderness: There is no abdominal tenderness. There is no guarding or rebound.      Hernia: No hernia is present.   Musculoskeletal:         General: No swelling or deformity. Normal range of motion.      Cervical back: Normal range of motion.      Right lower leg: No edema.   Skin:     General: Skin is warm.      Capillary Refill: Capillary refill takes 2 to 3 seconds.      Coloration: Skin is not jaundiced.      Findings: No bruising.   Neurological:      General: No focal deficit present.      Mental Status: She is oriented to person, place, and time.      Cranial Nerves: No cranial nerve deficit.      Motor: No weakness.   Psychiatric:         Mood and Affect: Mood normal.         Fluids    Intake/Output Summary (Last 24 hours) at 1/1/2022 0913  Last data filed at 1/1/2022 0445  Gross per 24 hour   Intake 800 ml   Output 320 ml   Net 480 ml       Laboratory  Recent Labs     12/31/21  1249 01/01/22  0434   WBC 7.3 5.3   RBC 4.63 4.33   HEMOGLOBIN 13.9 13.4   HEMATOCRIT 42.7 40.1   MCV 92.2 92.6   MCH 30.0 30.9   MCHC 32.6* 33.4*   RDW 45.2 44.9   PLATELETCT 259 237   MPV 10.4 10.0     Recent Labs     12/31/21  1249 01/01/22  0434   SODIUM 138 141   POTASSIUM 4.3 4.3   CHLORIDE 105 108   CO2 20 21   GLUCOSE 105* 142*   BUN 20 18   CREATININE 1.21 1.11   CALCIUM 9.3 8.8                   Imaging  DX-CYSTO FLUORO > 1 HOUR   Final Result      Cysto fluoroscopy utilized for 7 seconds         INTERPRETING LOCATION: [address] [place] [address], [facility], [address]      OUTSIDE IMAGES-CT ABDOMEN /PELVIS   Final Result      US-RENAL   Final Result      1.  Mild to moderate LEFT  hydroureteronephrosis.   2.  Unremarkable RIGHT kidney.   3.  Limited evaluation of urinary bladder.           Assessment/Plan  * Ureterovesical junction (UVJ) obstruction- (present on admission)  Assessment & Plan  OSH CT day prior to admission showed 5.6 left UVJ obstructing stone with hydroureteronephrosis, prescribed antibiotics and discharged with close urological follow-up due to inability to transfer.  Presented today for worsening pain, episodes of nausea chills night prior to presentation.  Dr. Strauss from urology --s/p stent  She received ceftriaxone, will continue.  Follow-up urine cultures    Acute pyelonephritis- (present on admission)  Assessment & Plan  Secondary to obstructing UVJ stone with hydroureteronephrosis.  S/p stent  Continue ceftriaxone, follow urine culture.  Monitor urine output and renal function.    Dyslipidemia- (present on admission)  Assessment & Plan  Continue statin       VTE prophylaxis: SCDs/TEDs    I have performed a physical exam and reviewed and updated ROS and Plan today (1/1/2022). In review of yesterday's note (12/31/2021), there are no changes except as documented above.

## 2022-01-01 NOTE — ANESTHESIA PROCEDURE NOTES
Airway    Date/Time: 12/31/2021 9:29 PM  Performed by: Obed Hurley M.D.  Authorized by: Obed Hurley M.D.     Location:  OR  Urgency:  Elective  Indications for Airway Management:  Anesthesia      Spontaneous Ventilation: absent    Sedation Level:  Deep  Preoxygenated: Yes    Mask Difficulty Assessment:  0 - not attempted  Final Airway Type:  Supraglottic airway  Final Supraglottic Airway:  Standard LMA    SGA Size:  4  Number of Attempts at Approach:  1

## 2022-01-01 NOTE — ED NOTES
Med rec completed per patient at bedside with RX bottles (Nocro, Toradol, Zofran, Omnicef) provided by patient; RX bottles reviewed and returned.  Allergies reviewed with patient.  Patient is on a 5 day course of Omnicef 300 mg, 1 capsule every 12 hours, which she reports she started today 12/31/2021 in the A.M.  Patient uses [facility] for long-term prescriptions and prefers [facility] in [place], [place] for acute prescriptions (patient has also filled with [facility] in [place]).

## 2022-01-01 NOTE — ED NOTES
Report called to FRANCK Baker in Pre-Op. Will continue to monitor while awaiting transport arrival.

## 2022-01-01 NOTE — ED PROVIDER NOTES
ED Provider Note        Primary care provider: Frank Fatima M.D.    I verified that the patient was wearing a mask and I was wearing appropriate PPE every time I entered the room. The patient's mask was on the patient at all times during my encounter except for a brief view of the oropharynx.      CHIEF COMPLAINT  Chief Complaint   Patient presents with   • Flank Pain     Pt BIB self to triage, report of left sided flank pain that started yesterday, hx of same. pt went to [facility] with no relief. pt states the ER had no Urology on call and thats why she came to [facility].   • N/V   • Chills       HPI  Ro Fletcher is a 76 y.o. female who presents to the Emergency Department with chief complaint of flank pain.  Patient's been seen twice this week for the same.  Most recently she was seen at [facility] yesterday evening where she was diagnosed with a 5.5 mm distal left UVJ stone with some minimal obstructive changes.  She did have slight signs of urinary tract infection at that time with 3-5 whites positive esterase positive nitrites.  Physician at [facility] yesterday made several attempts to transfer the patient however all tertiary care centers in the area were on divert and did not have urology available.  After discussion with urology it was decided to give her IV antibiotics to discharge with Toradol IV antibiotics and pain control.  Patient reports that she did take one of the hydrocodone's which unfortunately made her extremely nauseous and this evening she has had return of left flank and left lower quadrant pain some minor nausea she has had chills without measured fever she has had slight frontal headache no cough congestion chest pain or shortness of breath no problems with bowel movements no other acute symptoms or concerns at this time.    REVIEW OF SYSTEMS  10 systems reviewed and otherwise negative, pertinent positives and negatives listed in the history of present illness.    PAST  "MEDICAL HISTORY       SURGICAL HISTORY  patient denies any surgical history    SOCIAL HISTORY  Social History     Tobacco Use   • Smoking status: Never Smoker   • Smokeless tobacco: Never Used   Substance Use Topics   • Alcohol use: Yes     Comment: rare   • Drug use: Never      Social History     Substance and Sexual Activity   Drug Use Never       FAMILY HISTORY  Non-Contributory    CURRENT MEDICATIONS  Home Medications    **Home medications have not yet been reviewed for this encounter**         ALLERGIES  Allergies   Allergen Reactions   • Codeine Vomiting     hallucinations       PHYSICAL EXAM  VITAL SIGNS: /58   Pulse 64   Temp 36.9 °C (98.5 °F) (Oral)   Resp 20   Ht 1.702 m (5' 7\")   Wt 71 kg (156 lb 8.4 oz)   SpO2 99%   BMI 24.52 kg/m²   Pulse ox interpretation: I interpret this pulse ox as normal.  Constitutional: Alert and oriented x 3, minimal distress  HEENT: Atraumatic normocephalic, pupils are equal round, extraocular movements are intact. The nares is clear, external ears are normal, mouth shows moist mucous membranes  Neck: no obvious JVD or tracheal deviation  Cardiovascular: Regular rate and rhythm no murmur rub or gallop   Thorax & Lungs: No respiratory distress, no wheezes rales or rhonchi, No chest tenderness.   GI: Left flank tenderness with radiation left lower quadrant no rebound or guarding positive bowel sounds.  Skin: Warm dry no obvious acute rash or lesion  Musculoskeletal: Moving all extremities with normal range strength, no acute  deformity  Neurologic: Cranial nerves III through XII are grossly intact, no sensory deficit, no cerebellar dysfunction   Psychiatric: Appropriate affect for situation at this time      DIAGNOSTIC STUDIES / PROCEDURES  LABS      Results for orders placed or performed during the hospital encounter of 12/31/21   CBC WITH DIFFERENTIAL   Result Value Ref Range    WBC 7.3 4.8 - 10.8 K/uL    RBC 4.63 4.20 - 5.40 M/uL    Hemoglobin 13.9 12.0 - 16.0 " g/dL    Hematocrit 42.7 37.0 - 47.0 %    MCV 92.2 81.4 - 97.8 fL    MCH 30.0 27.0 - 33.0 pg    MCHC 32.6 (L) 33.6 - 35.0 g/dL    RDW 45.2 35.9 - 50.0 fL    Platelet Count 259 164 - 446 K/uL    MPV 10.4 9.0 - 12.9 fL    Neutrophils-Polys 74.90 (H) 44.00 - 72.00 %    Lymphocytes 14.90 (L) 22.00 - 41.00 %    Monocytes 8.90 0.00 - 13.40 %    Eosinophils 0.40 0.00 - 6.90 %    Basophils 0.50 0.00 - 1.80 %    Immature Granulocytes 0.40 0.00 - 0.90 %    Nucleated RBC 0.00 /100 WBC    Neutrophils (Absolute) 5.49 2.00 - 7.15 K/uL    Lymphs (Absolute) 1.09 1.00 - 4.80 K/uL    Monos (Absolute) 0.65 0.00 - 0.85 K/uL    Eos (Absolute) 0.03 0.00 - 0.51 K/uL    Baso (Absolute) 0.04 0.00 - 0.12 K/uL    Immature Granulocytes (abs) 0.03 0.00 - 0.11 K/uL    NRBC (Absolute) 0.00 K/uL   COMP METABOLIC PANEL   Result Value Ref Range    Sodium 138 135 - 145 mmol/L    Potassium 4.3 3.6 - 5.5 mmol/L    Chloride 105 96 - 112 mmol/L    Co2 20 20 - 33 mmol/L    Anion Gap 13.0 7.0 - 16.0    Glucose 105 (H) 65 - 99 mg/dL    Bun 20 8 - 22 mg/dL    Creatinine 1.21 0.50 - 1.40 mg/dL    Calcium 9.3 8.5 - 10.5 mg/dL    AST(SGOT) 20 12 - 45 U/L    ALT(SGPT) 20 2 - 50 U/L    Alkaline Phosphatase 89 30 - 99 U/L    Total Bilirubin 0.7 0.1 - 1.5 mg/dL    Albumin 4.3 3.2 - 4.9 g/dL    Total Protein 6.3 6.0 - 8.2 g/dL    Globulin 2.0 1.9 - 3.5 g/dL    A-G Ratio 2.2 g/dL   LIPASE   Result Value Ref Range    Lipase 26 11 - 82 U/L   URINALYSIS    Specimen: Urine   Result Value Ref Range    Color Yellow     Character Cloudy (A)     Specific Gravity 1.030 <1.035    Ph 5.0 5.0 - 8.0    Glucose Negative Negative mg/dL    Ketones 15 (A) Negative mg/dL    Protein Negative Negative mg/dL    Bilirubin Negative Negative    Urobilinogen, Urine 0.2 Negative    Nitrite Negative Negative    Leukocyte Esterase Trace (A) Negative    Occult Blood Small (A) Negative    Micro Urine Req Microscopic    ESTIMATED GFR   Result Value Ref Range    GFR If  52 (A) >60  mL/min/1.73 m 2    GFR If Non  43 (A) >60 mL/min/1.73 m 2   URINE MICROSCOPIC (W/UA)   Result Value Ref Range    WBC 10-20 (A) /hpf    RBC 10-20 (A) /hpf    Bacteria Negative None /hpf    Epithelial Cells Few /hpf    Ca Oxalate Crystal Few /hpf   Magnesium   Result Value Ref Range    Magnesium 1.9 1.5 - 2.5 mg/dL   PHOSPHORUS   Result Value Ref Range    Phosphorus 2.8 2.5 - 4.5 mg/dL   SARS-COV Antigen TOBY: Collect dry nasal swab   Result Value Ref Range    SARS-CoV-2 Source Nasal Swab     SARS-COV ANTIGEN TOBY NotDetected NotDetected   EKG   Result Value Ref Range    Report       [facility]    Test Date:  2021  Pt Name:    DANIELE ROBLEDO                 Department:   MRN:        [id number]                      Room:       [facility]  Gender:     Female                       Technician: LÓPEZ  :        [id number]                   Requested By:LIZBETH ASHRAF  Order #:    [id number]                    Reading MD:    Measurements  Intervals                                Axis  Rate:       59                           P:          79  IN:         188                          QRS:        23  QRSD:       94                           T:          10  QT:         452  QTc:        448    Interpretive Statements  SINUS BRADYCARDIA  BORDERLINE T ABNORMALITIES, ANTERIOR LEADS  No previous ECG available for comparison         All labs reviewed by me.      RADIOLOGY  OUTSIDE IMAGES-CT ABDOMEN /PELVIS   Final Result      US-RENAL   Final Result      1.  Mild to moderate LEFT hydroureteronephrosis.   2.  Unremarkable RIGHT kidney.   3.  Limited evaluation of urinary bladder.      DX-CYSTO FLUORO > 1 HOUR    (Results Pending)         COURSE & MEDICAL DECISION MAKING  Pertinent Labs & Imaging studies reviewed. (See chart for details)    4:12 PM - Patient seen and examined at bedside.     Coagulation studies were ordered in light of need for surgical intervention    Patient noted to have slightly elevated blood  "pressure likely circumstantial secondary to presenting complaint. Referred to primary care physician for further evaluation.      Medical Decision Making: Very pleasant 76-year-old female with distal left obstructive uropathy secondary to 5.5 mm stone.  Images were obtained from outside hospital and reviewed.  Her urine today is improved still esterase positive is nitrite negative few epithelials her white count is negative she is afebrile.  She does however have persistent obstructive findings on ultrasound with some left sided hydroureter hydronephrosis.  I discussed case with urologist Dr. Strauss.  We are both in agreement that patient would benefit from alleviation of this obstruction.  He request that she be made n.p.o. for probable stenting this evening.  I also discussed the case with hospitalist  she is admitted for ongoing evaluation and treatment.    /63   Pulse (!) 59   Temp 36.9 °C (98.4 °F) (Temporal)   Resp 16   Ht 1.702 m (5' 7\")   Wt 71 kg (156 lb 8.4 oz)   SpO2 98%   BMI 24.52 kg/m²         FINAL IMPRESSION  1. Flank pain Active   2. Ureterolithiasis Active        This dictation has been created using voice recognition software and/or scribes. The accuracy of the dictation is limited by the abilities of the software and the expertise of the scribes. I expect there may be some errors of grammar and possibly content. I made every attempt to manually correct the errors within my dictation. However, errors related to voice recognition software and/or scribes may still exist and should be interpreted within the appropriate context.          "

## 2022-01-01 NOTE — ANESTHESIA PREPROCEDURE EVALUATION
Case: [id number] Date/Time: 12/31/21 2100    Procedure: CYSTOSCOPY, WITH URETERAL STENT INSERTION    Location: [facility] OR 18 / SURGERY [facility]    Surgeons: Curt Strauss M.D.        77 yo F w/ hx of HLD, here w/ kidney stones and left hydro. NPO. METS >4.  Relevant Problems      (positive) Acute pyelonephritis       Physical Exam    Airway   Mallampati: III  TM distance: >3 FB  Neck ROM: full       Cardiovascular - normal exam  Rhythm: regular  Rate: normal  (-) murmur     Dental - normal exam           Pulmonary - normal exam  Breath sounds clear to auscultation     Abdominal    Neurological - normal exam                 Anesthesia Plan    ASA 2- EMERGENT   ASA physical status emergent criteria: compromised vital organ, limb or tissue    Plan - general       Airway plan will be LMA          Induction: intravenous    Postoperative Plan: Postoperative administration of opioids is intended.    Pertinent diagnostic labs and testing reviewed    Informed Consent:    Anesthetic plan and risks discussed with patient.    Use of blood products discussed with: patient whom consented to blood products.

## 2022-01-01 NOTE — DISCHARGE SUMMARY
Discharge Summary    CHIEF COMPLAINT ON ADMISSION  Chief Complaint   Patient presents with   • Flank Pain     Pt BIB self to triage, report of left sided flank pain that started yesterday, hx of same. pt went to [facility] with no relief. pt states the ER had no Urology on call and thats why she came to [facility].   • N/V   • Chills       Reason for Admission  Sent by MD     Admission Date  12/31/2021    CODE STATUS  Prior    HPI & HOSPITAL COURSE  As per dr scott Starr Tosha Fletcher is a 76 y.o. female with  dyslipidemia controlled with statin and prior history of multiple episodes of nephrolithiasis, who presented 12/31/2021 with known obstructive UVJ stone with hydroureteronephrosis, now with worsening pain, nausea, vomiting.  Patient has been experiencing hematuria for over a week, was sent for a CT outpatient that was negative for stone.  She had mild left-sided flank pain at that time was described as aching, no aggravating or alleviating factors.  Following her negative CT the pain worsened and she went to the ED today which showed left-sided 5.6 mm UVJ stone with hydroureteronephrosis.  She was not having any systemic symptoms at that time, main complaint was pain and hematuria. Through joint decision-making between ERP, patient, discussion with urology as well as inability to transfer to facilities given capacity patient elected to go home with p.o. Omnicef, Zofran, Toradol.  She was treated with Rocephin in their ED.  Since leaving the hospital she has had chills, nausea with vomiting, worsening left flank pain now severe so she presented to [facility] for further evaluation.  Otherwise denies headaches, dyspnea, cough, diarrhea.  In the ED she was nontoxic, ERP discussed case with Dr. Strauss from urology who agrees to see patient and likely perform procedure tonight.  She was given Rocephin and subsequently for the hospitalist for admission.    Patient was made NPO. Given IVF and pain  management. Patient was given IV abx. She was taken to OR for stent placement by urology. See op report below. She was [id number] by urology for discharge. F/u urology one week        Therefore, she is discharged in good and stable condition to home with close outpatient follow-up.    The patient recovered much more quickly than anticipated on admission.    Discharge Date  1/1/2022    FOLLOW UP ITEMS POST DISCHARGE      DISCHARGE DIAGNOSES  Principal Problem (Resolved):    Ureterovesical junction (UVJ) obstruction POA: Yes  Active Problems:    Dyslipidemia POA: Yes  Resolved Problems:    Acute pyelonephritis POA: Yes      FOLLOW UP  No future appointments.  Curt Strauss M.D.  [address]  [facility] [address]  [phone]    Schedule an appointment as soon as possible for a visit in 1 week      Pcp Not In Computer            MEDICATIONS ON DISCHARGE     Medication List      CONTINUE taking these medications      Instructions   atorvastatin 20 MG Tabs  Commonly known as: LIPITOR   Take 20 mg by mouth at bedtime.  Dose: 20 mg     cefdinir 300 MG Caps  Commonly known as: OMNICEF   Take 300 mg by mouth every 12 hours. 5 day course started 12/31/2021 in the A.M.  Dose: 300 mg     FISH OIL PO   Take 1 Capsule by mouth every morning.  Dose: 1 Capsule     HYDROcodone-acetaminophen 5-325 MG Tabs per tablet  Commonly known as: NORCO   Take 1 Tablet by mouth every four hours as needed for Severe Pain.  Dose: 1 Tablet     ketorolac 10 MG Tabs  Commonly known as: TORADOL   Take 10 mg by mouth every 6 hours as needed for Moderate Pain.  Dose: 10 mg     METAMUCIL PO   Take 1 Scoop by mouth every morning.  Dose: 1 Scoop     MiraLax 17 GM/SCOOP Powd  Generic drug: polyethylene glycol 3350   Take 17 g by mouth 1 time a day as needed (Constipation).  Dose: 17 g     Multivitamin Adults 50+ Tabs   Take 1 Tablet by mouth every morning.  Dose: 1 Tablet     ondansetron 4 MG Tbdp  Commonly known as: ZOFRAN ODT   Take 4 mg by mouth every 6  hours as needed for Nausea/Vomiting.  Dose: 4 mg     VITAMIN D3 PO   Take 1 Capsule by mouth every morning.  Dose: 1 Capsule            Allergies  Allergies   Allergen Reactions   • Codeine Vomiting     hallucinations       DIET  No orders of the defined types were placed in this encounter.      ACTIVITY  As tolerated.  Weight bearing as tolerated    CONSULTATIONS  Dr alvarez urology    PROCEDURES  Date of Service:  12/31/2021 10:08 PM                         []Hide copied text    []Dolores for details    DATE OF SERVICE:  12/31/2021      PREOPERATIVE DIAGNOSES:  1.  Left flank pain.  2.  Left distal ureteral stone.  3.  Left obstructing pyelonephrosis.     OPERATIONS AND PROCEDURES PERFORMED:  1.  Rigid cystourethroscopy.  2.  Left 6-French x 24 cm double-J stent insertion.     SURGEON:  Curt Alvarez MD     ANESTHESIOLOGIST:  Obed Hurley MD     ANESTHESIA:  General laryngeal mask.     POSTOPERATIVE DIAGNOSES:  1.  Left flank pain.  2.  Left distal ureteral stone.  3.  Left obstructing pyelonephrosis.     COMPLICATIONS:  None.     DRAINS:  A 6-French x 24 cm stent, left ureter.     INTRAOPERATIVE FINDINGS:  The patient has a high-grade left ureteral   obstruction with pus draining under pressure, plus placement of guidewire and   continuing with stent placement.     INDICATIONS:  The patient is a 76-year-old woman with a 1-week history of   hematuria.  She was evaluated, went on to develop a urinary tract infection,   started on antibiotic therapy but then went on to develop severe intractable   nausea, vomiting, malaise, chills and was presented to [facility]   with evidence of obstructing pyonephrosis.  Films were sent from [facility] and   showed evidence of a left 5.6 mm distal ureteral stone with hydronephrosis.  I   discussed with the patient the option of nephrostomy tube versus stent.  She   wished to proceed with stent.  We discussed the risk of the procedure   including but not limited to risk of  perioperative worsening of her infection   with sepsis, a risk of inability to place a stent requiring nephrostomy tube.    With stent placement, there is associated risk of having significant pain,   urgency, frequency, migration as well as the fact that no attempts will be   made to treat the stone due to the presence of infection and that she will   need a secondary procedure, failure to follow through with the stent removal   can result in significant stone formation and loss of renal function.  I have   also discussed the perioperative risk of deep vein thrombosis, pulmonary   embolism, aspiration pneumonia, heart attack, stroke and death.  Informed   consent was given to me by the patient to proceed and she was marked on her   left thigh with my initials DH and letter Y for safe site surgery.     DESCRIPTION IN DETAIL:  After informed consent was obtained, the patient was   brought to the operating room and placed supine.  A general laryngeal mask   anesthetic was administered in a balanced fashion.  Bilateral sequential   compression device in place and operational and the patient was positioned in   modified lithotomy and the operative area was Betadine prepped and draped in   the usual sterile fashion.  A surgical timeout was called and all members of   the operative team agree as the patient's name, procedure to be performed, the   fact that it is left side and that she has received Rocephin.  As such,   attention was directed to the procedure.     I passed a generously lubricated 25-New Zealander rigid cystoscope per urethra.  The   urethra was normal.  The bladder showed mild erythema of the left ureteral   orifice did not exhibit efflux.  The right ureteral orifice had efflux.  There   was 1+ trabeculation.  The bladder was drained.  I then cannulated the left   ureteral orifice with a 0.038 Sensor wire.  Upon doing so, high-grade pus was   seen draining out of the left ureter, advanced the wire up into the  kidney and   try to use as minimal hydrodistention of the bladder as possible.  With   drainage of the bladder, I was able to advance a #6-Greenlandic x 24 cm double-J   stent up into her kidney.  When I withdrew the guidewire, there was a good   coil in the renal pelvis and in the bladder, the eyelets were seen draining   pus under high pressure.  At the end of the case, the bladder was drained.    She tolerated the procedure well without complication, was awakened in the   operating room and transferred to recovery room where she arrived in stable   condition.           ______________________________  MD MIKO Easley/KRISSY     DD:  12/31/2021 22:08  DT:  12/31/2021 22:43     Job#:  [id number]         Last signed by: Curt Strauss M.D. at 1/1/2022  8:00 AM         Routing History                                          LABORATORY  Lab Results   Component Value Date    SODIUM 141 01/01/2022    POTASSIUM 4.3 01/01/2022    CHLORIDE 108 01/01/2022    CO2 21 01/01/2022    GLUCOSE 142 (H) 01/01/2022    BUN 18 01/01/2022    CREATININE 1.11 01/01/2022        Lab Results   Component Value Date    WBC 5.3 01/01/2022    HEMOGLOBIN 13.4 01/01/2022    HEMATOCRIT 40.1 01/01/2022    PLATELETCT 237 01/01/2022        Total time of the discharge process exceeds 38 minutes.

## 2022-01-01 NOTE — ASSESSMENT & PLAN NOTE
OSH CT day prior to admission showed 5.6 left UVJ obstructing stone with hydroureteronephrosis, prescribed antibiotics and discharged with close urological follow-up due to inability to transfer.  Presented today for worsening pain, episodes of nausea chills night prior to presentation.  Dr. Strauss from urology --s/p stent  She received ceftriaxone, will continue.  Follow-up urine cultures

## 2022-01-01 NOTE — CONSULTS
DATE OF SERVICE:  12/31/2021     UROLOGIC CONSULTATION     CONSULTATION REQUESTED BY:  Lul Tierney MD, emergency room physician.     CONSULTATION PERFORMED BY:  Curt Strauss MD, Urology, [place].     CHIEF COMPLAINT:  This is a 76-year-old woman with a left distal ureteral   stone and urinary tract infection.     HISTORY OF PRESENT ILLNESS:  The patient states she was in her usual state of   health until approximately 1 week ago.  She began experiencing some   intermittent gross hematuria.  She initially was evaluated with a negative CAT   scan, but developed severe progressive left-sided flank pain and then was   reevaluated with another CAT scan at [facility], which showed a 5 mm distal   ureteral stone.  The patient also had associated severe left flank pain,   nausea, vomiting, some malaise and chills and she was started on oral   antibiotic therapy and then transitioned to Rocephin in the Emergency Room   Department when she was noted to have chills associated with flank pain and a   picture consistent with obstructing pyonephrosis.  The patient presented to   [facility] for further evaluation and subsequently has been   admitted by the hospitalist and will need definitive urologic care.     PAST MEDICAL HISTORY:  Noteworthy for recurrent nephrolithiasis.     PAST SURGICAL HISTORY:  She denies any surgical history.     MEDICATIONS:  As an outpatient are none.     ALLERGIES:  CODEINE, WHICH CAUSED HALLUCINATION.     FAMILY HISTORY:  Noncontributory.     SOCIAL HISTORY:  She is a nonsmoker.  She uses alcohol socially.  She denies   any use of drugs.     REVIEW OF SYSTEMS:  GENERAL:  She has had no weight loss or weight gain, but she has had malaise,   fatigue and chills.  She denies fever.  She denies any chest pain, shortness   of breath, or cough.  She has had left flank pain.  She has had nausea and   vomiting and some abdominal distention with constipation.  She denies any   current  urgency, frequency.  Has had intermittent hematuria.     PHYSICAL EXAMINATION:  GENERAL:  She is a well-developed, well-nourished woman in no acute distress.  HEENT:  Normocephalic, atraumatic.  Pupils equal and round.  NECK:  Supple.  CHEST:  Clear bilaterally.  CARDIOVASCULAR:  Regular rate and rhythm without murmur.  ABDOMEN:  Soft, nontender.  She is mildly tender to deep palpation in the left   upper quadrant.  BACK:  With left costovertebral tenderness.  EXTREMITIES:  Without clubbing, cyanosis, edema.  NEUROLOGIC:  Cranial nerves II-XII intact.  Motor and sensory examination   nonfocal.  PSYCHIATRIC:  Appropriate mood and judgment.     LABORATORY DATA AND STUDIES:  Her white blood cell count is 7300.  Creatinine   1.21.  Normal liver panel.     She has been evaluated with a CAT scan.  The films have been pushed over,   shows a 5.6 mm distal ureteral stone.  She has a urinalysis consistent with   infection and signs and symptoms consistent with obstructing pyonephrosis.     ASSESSMENT:  Left distal ureteral stone with obstructing pyonephrosis.     PLAN AND RECOMMENDATIONS:  I discussed with the patient the treatment options   of nephrostomy tube or stent.  She is clinically stable for a stent and wishes   to proceed and she wishes to avoid an external drainage.  We discussed the   procedures involved including cystoscopy, stent placement and she is aware no   definitive treatment will be performed in the face of infection.  As such, she   has given me informed consent to proceed with surgery and the risks, benefits   will be discussed in the surgical dictation.        ______________________________  MD MIKO Easley/VICENTE    DD:  12/31/2021 21:59  DT:  12/31/2021 22:17    Job#:  [id number]

## 2022-01-01 NOTE — ED NOTES
PIV placed, antibiotic given and IV fluids started per MAR, pt tolerated well. Pt states that the last time she ate food was last night and she had a drank water today at 1630 but nothing since.

## 2022-01-01 NOTE — ED NOTES
Pt being taken to Pre-Op at this time by Transport Tech via gurney. Pt is awake and alert, talking to staff, in no apparent distress at time of transfer. Pt's paperwork and belongings sent upstairs with pt and Transport Tech.

## 2022-01-01 NOTE — PROGRESS NOTES
"Urology Progress Note    Post op Day # 1    Overnight Events: None    S: No fevers, chills, nausea or vomiting.  Denies dysuria.  +hematuria.    O:   /59   Pulse 80   Temp 37.1 °C (98.7 °F) (Temporal)   Resp 18   Ht 1.702 m (5' 7\")   Wt 71 kg (156 lb 8.4 oz)   SpO2 97%   Recent Labs     12/31/21  1249 01/01/22  0434   SODIUM 138 141   POTASSIUM 4.3 4.3   CHLORIDE 105 108   CO2 20 21   GLUCOSE 105* 142*   BUN 20 18   CREATININE 1.21 1.11   CALCIUM 9.3 8.8     Recent Labs     12/31/21  1249 01/01/22  0434   WBC 7.3 5.3   RBC 4.63 4.33   HEMOGLOBIN 13.9 13.4   HEMATOCRIT 42.7 40.1   MCV 92.2 92.6   MCH 30.0 30.9   MCHC 32.6* 33.4*   RDW 45.2 44.9   PLATELETCT 259 237   MPV 10.4 10.0         Intake/Output Summary (Last 24 hours) at 1/1/2022 1136  Last data filed at 1/1/2022 0445  Gross per 24 hour   Intake 800 ml   Output 320 ml   Net 480 ml       Exam:  Abdomen soft, benign.     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   - Pt is cleared for discharge from urology standpoint.  Our office will arrange outpatient f/u with stone treatment and stent removal in 2-3 weeks.     "

## 2022-01-01 NOTE — PROGRESS NOTES
4 Eyes Skin Assessment Completed by FRANCK Montejo and FRANCK Rice.    Head WDL  Ears WDL  Nose WDL  Mouth WDL  Neck WDL  Breast/Chest WDL  Shoulder Blades WDL  Spine WDL  (R) Arm/Elbow/Hand WDL  (L) Arm/Elbow/Hand WDL  Abdomen WDL  Groin WDL  Scrotum/Coccyx/Buttocks WDL  (R) Leg WDL  (L) Leg WDL  (R) Heel/Foot/Toe WDL  (L) Heel/Foot/Toe WDL            Interventions In Place N/A    Possible Skin Injury No    Pictures Uploaded Into Epic N/A  Wound Consult Placed N/A  RN Wound Prevention Protocol Ordered No

## 2022-01-01 NOTE — DISCHARGE INSTRUCTIONS
Discharge Instructions    Discharged to home by car with self. Discharged via wheelchair, hospital escort: Yes.  Special equipment needed: Not Applicable    Be sure to schedule a follow-up appointment with your primary care doctor or any specialists as instructed.     Discharge Plan:   Influenza Vaccine Indication: Not indicated: Previously immunized this influenza season and > 8 years of age    I understand that a diet low in cholesterol, fat, and sodium is recommended for good health. Unless I have been given specific instructions below for another diet, I accept this instruction as my diet prescription.   Other diet: n/a    Special Instructions: None    · Is patient discharged on Warfarin / Coumadin?   No           Acute Urinary Retention, Female    Acute urinary retention means that you cannot pee (urinate) at all, or that you pee too little and your bladder is not emptied completely. If it is not treated, it can lead to kidney damage or other serious problems.  Follow these instructions at home:  · Take over-the-counter and prescription medicines only as told by your doctor. Ask your doctor what medicines you should stay away from. Do not take any medicine unless your doctor says it is okay to do so.  · If you were sent home with a tube that drains pee from the bladder (catheter), take care of it as told by your doctor.  · Drink enough fluid to keep your pee clear or pale yellow.  · If you were given an antibiotic, take it as told by your doctor. Do not stop taking the antibiotic even if you start to feel better.  · Do not use any products that contain nicotine or tobacco, such as cigarettes and e-cigarettes. If you need help quitting, ask your doctor.  · Watch for changes in your symptoms. Tell your doctor about them.  · If told, keep track of any changes in your blood pressure at home. Tell your doctor about them.  · Keep all follow-up visits as told by your doctor. This is important.  Contact a doctor  if:  · You have spasms or you leak pee when you have spasms.  Get help right away if:  · You have chills or a fever.  · You have blood in your pee.  · You have a tube that drains the bladder and:  ? The tube stops draining pee.  ? The tube falls out.  Summary  · Acute urinary retention means that you cannot pee at all, or that you pee too little and your bladder is not emptied completely. If it is not treated, it can result in kidney damage or other serious problems.  · If you were sent home with a tube that drains pee from the bladder, take care of it as told by your doctor.  · Pay attention to any changes in your symptoms. Tell your doctor about them.  This information is not intended to replace advice given to you by your health care provider. Make sure you discuss any questions you have with your health care provider.  Document Released: 06/05/2009 Document Revised: 11/30/2018 Document Reviewed: 01/19/2018  [employer] Patient Education © 2020 [employer]              Depression / Suicide Risk    As you are discharged from this [facility] facility, it is important to learn how to keep safe from harming yourself.    Recognize the warning signs:  · Abrupt changes in personality, positive or negative- including increase in energy   · Giving away possessions  · Change in eating patterns- significant weight changes-  positive or negative  · Change in sleeping patterns- unable to sleep or sleeping all the time   · Unwillingness or inability to communicate  · Depression  · Unusual sadness, discouragement and loneliness  · Talk of wanting to die  · Neglect of personal appearance   · Rebelliousness- reckless behavior  · Withdrawal from people/activities they love  · Confusion- inability to concentrate     If you or a loved one observes any of these behaviors or has concerns about self-harm, here's what you can do:  · Talk about it- your feelings and reasons for harming yourself  · Remove any means that you might use to  hurt yourself (examples: pills, rope, extension cords, firearm)  · Get professional help from the community (Mental Health, Substance Abuse, psychological counseling)  · Do not be alone:Call your Safe Contact- someone whom you trust who will be there for you.  · Call your local CRISIS HOTLINE 861-4286 or 007-420-4241  · Call your local Children's Mobile Crisis Response Team [place] (790) 954-7357 or www.[employer]  · Call the toll free National Suicide Prevention Hotlines   · National Suicide Prevention Lifeline 834-723-UKHQ (7213)  · National Hope Line Network 800-SUICIDE (613-3074)

## 2022-01-03 LAB
BACTERIA UR CULT: NORMAL
SIGNIFICANT IND 70042: NORMAL
SITE SITE: NORMAL
SOURCE SOURCE: NORMAL

## 2022-12-31 PROCEDURE — 0T778DZ DILATION OF LEFT URETER WITH INTRALUMINAL DEVICE, VIA NATURAL OR ARTIFICIAL OPENING ENDOSCOPIC: ICD-10-PCS | Performed by: UROLOGY

## 2023-10-17 ENCOUNTER — APPOINTMENT (RX ONLY)
Dept: URBAN - METROPOLITAN AREA CLINIC 38 | Facility: CLINIC | Age: 78
Setting detail: DERMATOLOGY
End: 2023-10-17

## 2023-10-17 DIAGNOSIS — L72.0 EPIDERMAL CYST: ICD-10-CM

## 2023-10-17 DIAGNOSIS — D22 MELANOCYTIC NEVI: ICD-10-CM

## 2023-10-17 DIAGNOSIS — Z71.89 OTHER SPECIFIED COUNSELING: ICD-10-CM

## 2023-10-17 DIAGNOSIS — D18.0 HEMANGIOMA: ICD-10-CM

## 2023-10-17 DIAGNOSIS — L81.4 OTHER MELANIN HYPERPIGMENTATION: ICD-10-CM

## 2023-10-17 DIAGNOSIS — I78.8 OTHER DISEASES OF CAPILLARIES: ICD-10-CM

## 2023-10-17 DIAGNOSIS — L82.1 OTHER SEBORRHEIC KERATOSIS: ICD-10-CM

## 2023-10-17 PROBLEM — D22.9 MELANOCYTIC NEVI, UNSPECIFIED: Status: ACTIVE | Noted: 2023-10-17

## 2023-10-17 PROBLEM — D18.01 HEMANGIOMA OF SKIN AND SUBCUTANEOUS TISSUE: Status: ACTIVE | Noted: 2023-10-17

## 2023-10-17 PROCEDURE — 99203 OFFICE O/P NEW LOW 30 MIN: CPT

## 2023-10-17 PROCEDURE — ? COUNSELING

## 2023-10-17 PROCEDURE — ? EXTRACTIONS

## 2023-10-17 ASSESSMENT — LOCATION DETAILED DESCRIPTION DERM
LOCATION DETAILED: NASAL DORSUM
LOCATION DETAILED: PERIUMBILICAL SKIN
LOCATION DETAILED: RIGHT INFERIOR UPPER BACK
LOCATION DETAILED: RIGHT LATERAL SUPERIOR TARSAL REGION

## 2023-10-17 ASSESSMENT — LOCATION ZONE DERM
LOCATION ZONE: TRUNK
LOCATION ZONE: NOSE
LOCATION ZONE: EYELID

## 2023-10-17 ASSESSMENT — LOCATION SIMPLE DESCRIPTION DERM
LOCATION SIMPLE: ABDOMEN
LOCATION SIMPLE: NOSE
LOCATION SIMPLE: RIGHT UPPER BACK
LOCATION SIMPLE: RIGHT LATERAL SUPERIOR TARSAL REGION

## 2023-10-17 NOTE — PROCEDURE: EXTRACTIONS
Last appt 4/20/18  Next appt 7/16/18    Refill request for   Disp Refills Start End    calcitRIOL (ROCALTROL) 0.25 MCG capsule 90 capsule 0 10/9/2017     Sig - Route: Take 1 capsule by mouth daily        Refill eprescribed per refill protocol.  
Consent was obtained and risks were reviewed including but not limited to scarring, infection, bleeding, scabbing, incomplete removal, and allergy to anesthesia.
Detail Level: Detailed
Render Number Of Lesions Treated: yes
Acne Type: Milial cysts
Prep Text (Optional): Prior to removal the treatment areas were prepped in the usual fashion.
Render Post-Care Instructions In Note?: no
Extraction Method: 11 blade and comedo extractor
Post-Care Instructions: I reviewed with the patient in detail post-care instructions. Patient is to keep the treatment areas dry overnight, and then apply bacitracin twice daily until healed. Patient may apply hydrogen peroxide soaks to remove any crusting.

## 2024-10-10 ENCOUNTER — APPOINTMENT (RX ONLY)
Dept: URBAN - METROPOLITAN AREA CLINIC 38 | Facility: CLINIC | Age: 79
Setting detail: DERMATOLOGY
End: 2024-10-10

## 2024-10-10 DIAGNOSIS — D22 MELANOCYTIC NEVI: ICD-10-CM

## 2024-10-10 DIAGNOSIS — L81.4 OTHER MELANIN HYPERPIGMENTATION: ICD-10-CM

## 2024-10-10 DIAGNOSIS — Z71.89 OTHER SPECIFIED COUNSELING: ICD-10-CM

## 2024-10-10 DIAGNOSIS — D18.0 HEMANGIOMA: ICD-10-CM

## 2024-10-10 DIAGNOSIS — L82.1 OTHER SEBORRHEIC KERATOSIS: ICD-10-CM

## 2024-10-10 PROBLEM — D18.01 HEMANGIOMA OF SKIN AND SUBCUTANEOUS TISSUE: Status: ACTIVE | Noted: 2024-10-10

## 2024-10-10 PROBLEM — D22.9 MELANOCYTIC NEVI, UNSPECIFIED: Status: ACTIVE | Noted: 2024-10-10

## 2024-10-10 PROCEDURE — ? COUNSELING

## 2024-10-10 PROCEDURE — 99213 OFFICE O/P EST LOW 20 MIN: CPT

## 2024-10-10 ASSESSMENT — LOCATION SIMPLE DESCRIPTION DERM
LOCATION SIMPLE: LEFT ANTERIOR NECK
LOCATION SIMPLE: ABDOMEN
LOCATION SIMPLE: POSTERIOR NECK
LOCATION SIMPLE: RIGHT FOREARM
LOCATION SIMPLE: RIGHT UPPER BACK
LOCATION SIMPLE: LEFT FOREARM
LOCATION SIMPLE: INFERIOR FOREHEAD

## 2024-10-10 ASSESSMENT — LOCATION DETAILED DESCRIPTION DERM
LOCATION DETAILED: LEFT DISTAL DORSAL FOREARM
LOCATION DETAILED: INFERIOR MID FOREHEAD
LOCATION DETAILED: RIGHT INFERIOR UPPER BACK
LOCATION DETAILED: RIGHT PROXIMAL DORSAL FOREARM
LOCATION DETAILED: LEFT CLAVICULAR NECK
LOCATION DETAILED: RIGHT MEDIAL TRAPEZIAL NECK
LOCATION DETAILED: PERIUMBILICAL SKIN

## 2024-10-10 ASSESSMENT — LOCATION ZONE DERM
LOCATION ZONE: ARM
LOCATION ZONE: NECK
LOCATION ZONE: FACE
LOCATION ZONE: TRUNK

## (undated) DEVICE — GLOVE BIOGEL SZ 7 SURGICAL PF LTX - (50PR/BX 4BX/CA)

## (undated) DEVICE — GLOVE BIOGEL SZ 7.5 SURGICAL PF LTX - (50PR/BX 4BX/CA)

## (undated) DEVICE — TUBING CLEARLINK DUO-VENT - C-FLO (48EA/CA)

## (undated) DEVICE — TOWELS CLOTH SURGICAL - (4/PK 20PK/CA)

## (undated) DEVICE — HEAD HOLDER JUNIOR/ADULT

## (undated) DEVICE — NEPTUNE 4 PORT MANIFOLD - (20/PK)

## (undated) DEVICE — GLOVE BIOGEL PI INDICATOR SZ 8.0 SURGICAL PF LF -(50/BX 4BX/CA)

## (undated) DEVICE — PROTECTOR ULNA NERVE - (36PR/CA)

## (undated) DEVICE — WATER IRRIG. STER 3000 ML - (4/CA)

## (undated) DEVICE — MEDICINE CUP STERILE 2 OZ - (100/CA)

## (undated) DEVICE — JELLY SURGILUBE STERILE TUBE 4.25 OZ (1/EA)

## (undated) DEVICE — SET LEADWIRE 5 LEAD BEDSIDE DISPOSABLE ECG (1SET OF 5/EA)

## (undated) DEVICE — TUBE CONNECT SUCTION CLEAR 120 X 1/4" (50EA/CA)"

## (undated) DEVICE — MASK ANESTHESIA ADULT  - (100/CA)

## (undated) DEVICE — GOWN SURGEONS X-LARGE - DISP. (30/CA)

## (undated) DEVICE — SENSOR SPO2 NEO LNCS ADHESIVE (20/BX) SEE USER NOTES

## (undated) DEVICE — COVER FOOT UNIVERSAL DISP. - (25EA/CA)

## (undated) DEVICE — SET IRRIGATION CYSTOSCOPY Y-TYPE L81 IN (20EA/CA)

## (undated) DEVICE — MASK, LARYNGEAL AIRWAY #4

## (undated) DEVICE — SUCTION INSTRUMENT YANKAUER BULBOUS TIP W/O VENT (50EA/CA)

## (undated) DEVICE — PACK CYSTOSCOPY III - (8/CA)

## (undated) DEVICE — GOWN SURGICAL X-LARGE ULTRA - FILM-REINFORCED (20/CA)

## (undated) DEVICE — BAG URODRAIN WITH TUBING - (20/CA)

## (undated) DEVICE — CANISTER SUCTION 3000ML MECHANICAL FILTER AUTO SHUTOFF MEDI-VAC NONSTERILE LF DISP  (40EA/CA)

## (undated) DEVICE — LACTATED RINGERS INJ 1000 ML - (14EA/CA 60CA/PF)

## (undated) DEVICE — SLEEVE, VASO, THIGH, MED

## (undated) DEVICE — WATER IRRIGATION STERILE 1000ML (12EA/CA)

## (undated) DEVICE — ZIPWIRE .038 STRAIGHT BENTSON TIP (5EA/BX)

## (undated) DEVICE — ELECTRODE 850 FOAM ADHESIVE - HYDROGEL RADIOTRNSPRNT (50/PK)

## (undated) DEVICE — SET EXTENSION WITH 2 PORTS (48EA/CA) ***PART #2C8610 IS A SUBSTITUTE*****

## (undated) DEVICE — GLOVE BIOGEL PI INDICATOR SZ 8.5 SURGICAL PF LF - (50PR/BX 4BX/CA)

## (undated) DEVICE — KIT ANESTHESIA W/CIRCUIT & 3/LT BAG W/FILTER (20EA/CA)

## (undated) DEVICE — SPONGE GAUZESTER 4 X 4 4PLY - (128PK/CA)

## (undated) DEVICE — TOWEL STOP TIMEOUT SAFETY FLAG (40EA/CA)

## (undated) DEVICE — CONNECTOR HOSE NEPTUNE FOR CYSTO ROOM